# Patient Record
Sex: MALE | Race: WHITE | Employment: STUDENT | URBAN - METROPOLITAN AREA
[De-identification: names, ages, dates, MRNs, and addresses within clinical notes are randomized per-mention and may not be internally consistent; named-entity substitution may affect disease eponyms.]

---
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
## 2022-01-06 ENCOUNTER — NURSE ONLY (OUTPATIENT)
Dept: FAMILY MEDICINE CLINIC | Age: 22
End: 2022-01-06

## 2022-01-06 DIAGNOSIS — Z23 IMMUNIZATION DUE: Primary | ICD-10-CM

## 2022-01-06 PROCEDURE — 90471 IMMUNIZATION ADMIN: CPT | Performed by: FAMILY MEDICINE

## 2022-01-06 PROCEDURE — 90632 HEPA VACCINE ADULT IM: CPT | Performed by: FAMILY MEDICINE

## 2022-01-06 NOTE — PROGRESS NOTES
Immunizations Administered     Name Date Dose Route    Hepatitis A Adult (Havrix, Vaqta) 1/6/2022 1 mL Intramuscular    Site: Deltoid- Right    Lot: [facility]: [id number]

## 2022-08-05 ENCOUNTER — OFFICE VISIT (OUTPATIENT)
Dept: FAMILY MEDICINE CLINIC | Age: 22
End: 2022-08-05

## 2022-08-05 VITALS
HEIGHT: 72 IN | WEIGHT: 138 LBS | SYSTOLIC BLOOD PRESSURE: 112 MMHG | HEART RATE: 56 BPM | DIASTOLIC BLOOD PRESSURE: 72 MMHG | RESPIRATION RATE: 12 BRPM | BODY MASS INDEX: 18.69 KG/M2

## 2022-08-05 DIAGNOSIS — J02.9 ACUTE PHARYNGITIS, UNSPECIFIED ETIOLOGY: Primary | ICD-10-CM

## 2022-08-05 PROCEDURE — 99213 OFFICE O/P EST LOW 20 MIN: CPT | Performed by: FAMILY MEDICINE

## 2022-08-05 RX ORDER — AZITHROMYCIN 250 MG/1
250 TABLET, FILM COATED ORAL SEE ADMIN INSTRUCTIONS
Qty: 6 TABLET | Refills: 0 | Status: SHIPPED | OUTPATIENT
Start: 2022-08-05 | End: 2022-08-10

## 2022-08-05 RX ORDER — GUAIFENESIN, PSEUDOEPHEDRINE HYDROCHLORIDE 600; 60 MG/1; MG/1
1 TABLET, EXTENDED RELEASE ORAL EVERY 12 HOURS
Qty: 20 TABLET | Refills: 0 | Status: SHIPPED | OUTPATIENT
Start: 2022-08-05 | End: 2022-08-12

## 2022-08-05 SDOH — ECONOMIC STABILITY: FOOD INSECURITY: WITHIN THE PAST 12 MONTHS, YOU WORRIED THAT YOUR FOOD WOULD RUN OUT BEFORE YOU GOT MONEY TO BUY MORE.: NEVER TRUE

## 2022-08-05 SDOH — ECONOMIC STABILITY: FOOD INSECURITY: WITHIN THE PAST 12 MONTHS, THE FOOD YOU BOUGHT JUST DIDN'T LAST AND YOU DIDN'T HAVE MONEY TO GET MORE.: NEVER TRUE

## 2022-08-05 ASSESSMENT — ENCOUNTER SYMPTOMS
VOICE CHANGE: 1
NAUSEA: 0
ABDOMINAL PAIN: 0
TROUBLE SWALLOWING: 0
CONSTIPATION: 0
BLOOD IN STOOL: 0
BACK PAIN: 0
COUGH: 0
EYE PAIN: 0
CHEST TIGHTNESS: 0
SORE THROAT: 1
SHORTNESS OF BREATH: 0

## 2022-08-05 ASSESSMENT — PATIENT HEALTH QUESTIONNAIRE - PHQ9
SUM OF ALL RESPONSES TO PHQ QUESTIONS 1-9: 0
2. FEELING DOWN, DEPRESSED OR HOPELESS: 0
SUM OF ALL RESPONSES TO PHQ9 QUESTIONS 1 & 2: 0
SUM OF ALL RESPONSES TO PHQ QUESTIONS 1-9: 0
1. LITTLE INTEREST OR PLEASURE IN DOING THINGS: 0

## 2022-08-05 ASSESSMENT — SOCIAL DETERMINANTS OF HEALTH (SDOH): HOW HARD IS IT FOR YOU TO PAY FOR THE VERY BASICS LIKE FOOD, HOUSING, MEDICAL CARE, AND HEATING?: NOT HARD AT ALL

## 2022-08-05 NOTE — PROGRESS NOTES
Subjective:      Patient ID: Kailey Mccloud is a 25 y.o. male. No  covid  test     Pharyngitis  This is a new problem. Episode onset: last  3  to  4  days. Associated symptoms include a sore throat. Pertinent negatives include no abdominal pain, arthralgias, chest pain, congestion, coughing, fatigue, fever, headaches, joint swelling, nausea, rash or weakness. Nothing aggravates the symptoms. He has tried nothing for the symptoms. The treatment provided mild relief. No past medical history on file. Review of Systems   Constitutional:  Negative for fatigue and fever. HENT:  Positive for sore throat and voice change. Negative for congestion, ear pain, postnasal drip and trouble swallowing. Eyes:  Negative for pain. Respiratory:  Negative for cough, chest tightness and shortness of breath. Cardiovascular:  Negative for chest pain, palpitations and leg swelling. Gastrointestinal:  Negative for abdominal pain, blood in stool, constipation and nausea. Genitourinary:  Negative for difficulty urinating, frequency and urgency. Musculoskeletal:  Negative for arthralgias, back pain, joint swelling and neck stiffness. Skin:  Negative for rash. Neurological:  Negative for dizziness, weakness and headaches. Hematological:  Negative for adenopathy. Does not bruise/bleed easily. Psychiatric/Behavioral:  Negative for behavioral problems, dysphoric mood and sleep disturbance. ./72 (Site: Right Upper Arm, Position: Sitting, Cuff Size: Medium Adult)   Pulse 56   Resp 12   Ht 6' (1.829 m)   Wt 138 lb (62.6 kg)   BMI 18.72 kg/m²    Objective:   Physical Exam  Vitals and nursing note reviewed. Constitutional:       Appearance: He is well-developed. HENT:      Head: Normocephalic and atraumatic. Right Ear: External ear normal.      Left Ear: External ear normal.      Nose: Congestion present. Mouth/Throat:      Pharynx: Posterior oropharyngeal erythema present.       Comments: No  exudate  but  tonsils  2 + and  red   Eyes:      Conjunctiva/sclera: Conjunctivae normal.      Pupils: Pupils are equal, round, and reactive to light. Comments: Fundi nl   Neck:      Thyroid: No thyromegaly. Comments:   No  anterior  or  posterior  nodes   Cardiovascular:      Rate and Rhythm: Normal rate and regular rhythm. Heart sounds: Normal heart sounds. Pulmonary:      Effort: Pulmonary effort is normal.      Breath sounds: Normal breath sounds. No wheezing or rales. Abdominal:      General: Bowel sounds are normal.      Palpations: Abdomen is soft. There is no mass. Tenderness: There is no abdominal tenderness. Musculoskeletal:         General: Normal range of motion. Cervical back: Normal range of motion and neck supple. Lymphadenopathy:      Cervical: No cervical adenopathy. Skin:     General: Skin is warm and dry. Findings: No rash. Neurological:      Mental Status: He is alert and oriented to person, place, and time. Cranial Nerves: No cranial nerve deficit. Deep Tendon Reflexes: Reflexes are normal and symmetric. Assessment:        ICD-10-CM    1. Acute pharyngitis, unspecified etiology  J02.9              Plan:      Current Outpatient Medications   Medication Sig Dispense Refill    azithromycin (ZITHROMAX) 250 MG tablet Take 1 tablet by mouth See Admin Instructions for 5 days 500mg on day 1 followed by 250mg on days 2 - 5 6 tablet 0    pseudoephedrine-guaiFENesin (MUCINEX D)  MG per extended release tablet Take 1 tablet by mouth in the morning and 1 tablet in the evening. Do all this for 7 days. 20 tablet 0     No current facility-administered medications for this visit. No orders of the defined types were placed in this encounter.          See in    prn   Cecelia Olivares MD

## 2022-08-14 ENCOUNTER — HOSPITAL ENCOUNTER (EMERGENCY)
Age: 22
Discharge: HOME | End: 2022-08-14
Payer: COMMERCIAL

## 2022-08-14 VITALS
DIASTOLIC BLOOD PRESSURE: 65 MMHG | HEIGHT: 72 IN | SYSTOLIC BLOOD PRESSURE: 121 MMHG | OXYGEN SATURATION: 97 % | HEART RATE: 72 BPM | TEMPERATURE: 98.6 F | BODY MASS INDEX: 18.28 KG/M2 | RESPIRATION RATE: 16 BRPM | WEIGHT: 135 LBS

## 2022-08-14 DIAGNOSIS — S00.91XA ABRASION OF HEAD, INITIAL ENCOUNTER: Primary | ICD-10-CM

## 2022-08-14 PROCEDURE — 99203 OFFICE O/P NEW LOW 30 MIN: CPT | Performed by: NURSE PRACTITIONER

## 2022-08-14 PROCEDURE — 99203 OFFICE O/P NEW LOW 30 MIN: CPT

## 2022-08-14 ASSESSMENT — ENCOUNTER SYMPTOMS
SORE THROAT: 0
NAUSEA: 0
COUGH: 0
SHORTNESS OF BREATH: 0
VOMITING: 0

## 2022-08-14 ASSESSMENT — PAIN - FUNCTIONAL ASSESSMENT: PAIN_FUNCTIONAL_ASSESSMENT: 0-10

## 2022-08-14 ASSESSMENT — PAIN DESCRIPTION - ORIENTATION: ORIENTATION: LEFT

## 2022-08-14 ASSESSMENT — PAIN SCALES - GENERAL: PAINLEVEL_OUTOF10: 2

## 2022-08-14 ASSESSMENT — PAIN DESCRIPTION - LOCATION: LOCATION: HEAD

## 2022-08-14 NOTE — ED PROVIDER NOTES
[facility]  Urgent Care Encounter       CHIEF COMPLAINT       Chief Complaint   Patient presents with    Abrasion       Nurses Notes reviewed and I agree except as noted in the HPI. HISTORY OF PRESENT ILLNESS   Kenia Reddy is a 25 y.o. male who presents for evaluation of a wound to the left side of his head. Patient states that he was playing basketball last night around midnight when a ball hit a loose piece of wood which fell and hit him in the head. Patient states that he did not lose consciousness and has not had any visual disturbances, numbness or tingling, headaches or nausea or vomiting. Mother states that she did apply some antibiotic ointment and clean to the wound last night. Mother states that the patient is up-to-date on his tetanus. The history is provided by the patient. REVIEW OF SYSTEMS     Review of Systems   Constitutional:  Negative for chills and fever. HENT:  Negative for congestion and sore throat. Respiratory:  Negative for cough and shortness of breath. Cardiovascular:  Negative for chest pain. Gastrointestinal:  Negative for nausea and vomiting. Musculoskeletal:  Negative for arthralgias and myalgias. Skin:  Positive for wound. Negative for rash. Allergic/Immunologic: Negative for immunocompromised state. Neurological:  Negative for dizziness, weakness and headaches. PAST MEDICAL HISTORY   History reviewed. No pertinent past medical history. SURGICALHISTORY     Patient  has no past surgical history on file. CURRENT MEDICATIONS       Previous Medications    No medications on file       ALLERGIES     Patient is has No Known Allergies.     Patients   Immunization History   Administered Date(s) Administered    COVID-19, PFIZER PURPLE top, DILUTE for use, (age 15 y+), 30mcg/0.3mL 08/02/2021, 08/23/2021    DTaP 01/30/2003, 03/25/2003, 06/02/2003, 07/26/2004, 08/07/2008    HPV 9-valent Odella Sol) 07/06/2021, 08/12/2021    HPV Quadrivalent (Gardasil) 01/06/2022    Hepatitis A Adult (Havrix, Vaqta) 07/06/2021, 01/06/2022    Hepatitis B 2002, 01/30/2003, 06/02/2003    Hib, unspecified 01/30/2003, 03/25/2003, 06/02/2003, 03/08/2004    Influenza, MDCK Quadv, with preserv IM (Flucelvax 2 yrs and older) 11/26/2019    Influenza, Quadv, IM, (6 mo and older Fluzone, Flulaval, Fluarix and 3 yrs and older Afluria) 10/26/2018, 01/13/2021    MMR 03/08/2004, 08/21/2007    Meningococcal MCV4P (Menactra) 07/14/2020    PPD Test 07/06/2021    Polio IPV (IPOL) 01/30/2003, 03/25/2003, 07/26/2004, 08/07/2008    Tdap (Boostrix, Adacel) 08/04/2015, 07/14/2020    Varicella (Varivax) 03/02/2004, 08/04/2015       FAMILY HISTORY     Patient's family history is not on file. SOCIAL HISTORY     Patient  reports that he has never smoked. He has never used smokeless tobacco. He reports that he does not drink alcohol and does not use drugs. PHYSICAL EXAM     ED TRIAGE VITALS  BP: 121/65, Temp: 98.6 °F (37 °C), Heart Rate: 72, Resp: 16, SpO2: 97 %,Estimated body mass index is 18.31 kg/m² as calculated from the following:    Height as of this encounter: 6' (1.829 m). Weight as of this encounter: 135 lb (61.2 kg). ,No LMP for male patient. Physical Exam  Vitals and nursing note reviewed. Constitutional:       General: He is not in acute distress. Appearance: He is well-developed. He is not diaphoretic. HENT:      Head: Abrasion present. No raccoon eyes or Hodgson's sign. Comments: 2.5 cm linear abrasion noted to the left scalp  Eyes:      Extraocular Movements: Extraocular movements intact. Conjunctiva/sclera:      Right eye: Right conjunctiva is not injected. Left eye: Left conjunctiva is not injected. Pupils: Pupils are equal, round, and reactive to light. Pupils are equal.   Cardiovascular:      Rate and Rhythm: Normal rate and regular rhythm. Heart sounds: No murmur heard.   Pulmonary:      Effort: Pulmonary effort is normal. No respiratory distress. Breath sounds: Normal breath sounds. Musculoskeletal:      Cervical back: Full passive range of motion without pain and normal range of motion. No spinous process tenderness or muscular tenderness. Skin:     General: Skin is warm. Findings: No rash. Neurological:      Mental Status: He is alert and oriented to person, place, and time. GCS: GCS eye subscore is 4. GCS verbal subscore is 5. GCS motor subscore is 6. Motor: Motor function is intact. Psychiatric:         Behavior: Behavior normal.       DIAGNOSTIC RESULTS     Labs:No results found for this visit on 08/14/22. IMAGING:    No orders to display         EKG:      URGENT CARE COURSE:     Vitals:    08/14/22 1104   BP: 121/65   Pulse: 72   Resp: 16   Temp: 98.6 °F (37 °C)   SpO2: 97%   Weight: 135 lb (61.2 kg)   Height: 6' (1.829 m)       Medications - No data to display         PROCEDURES:  None    FINAL IMPRESSION      1. Abrasion of head, initial encounter          DISPOSITION/ PLAN     Wound to the scalp is superficial and I discussed that this will likely heal on its own. Patient will be given a prescription for Bactroban ointment to ensure that there is no infection is advised to keep the area clean and dry as much as possible. Advised to follow-up on an outpatient basis as needed and is agreeable to plan as discussed. PATIENT REFERRED TO:  Traci Porras MD  [address] [place] [address] / [facility] [id number]      DISCHARGE MEDICATIONS:  New Prescriptions    MUPIROCIN (BACTROBAN) 2 % OINTMENT    Apply topically 3 times daily.        Discontinued Medications    No medications on file       Current Discharge Medication List          MARY Dover CNP    (Please note that portions of this note were completed with a voice recognition program. Efforts were made to edit the dictations but occasionally words are mis-transcribed.)          MARY Dover CNP  08/14/22 [id number]

## 2022-08-14 NOTE — ED NOTES
Pt complains he was accidentally hit in the head with a wooden board while playing basketball last night. States the ball hit the board and fell on his head. Denies LOC. Linear abrasion noted on left side of head. No bleeding noted at this time.       Brandon Alvarado RN  08/14/22 2141

## 2023-06-06 ENCOUNTER — OFFICE VISIT (OUTPATIENT)
Dept: FAMILY MEDICINE CLINIC | Age: 23
End: 2023-06-06

## 2023-06-06 VITALS
RESPIRATION RATE: 12 BRPM | WEIGHT: 129.25 LBS | BODY MASS INDEX: 17.51 KG/M2 | HEIGHT: 72 IN | HEART RATE: 76 BPM | SYSTOLIC BLOOD PRESSURE: 114 MMHG | DIASTOLIC BLOOD PRESSURE: 68 MMHG

## 2023-06-06 DIAGNOSIS — R10.33 PERIUMBILICAL ABDOMINAL PAIN: Primary | ICD-10-CM

## 2023-06-06 DIAGNOSIS — K59.09 OTHER CONSTIPATION: ICD-10-CM

## 2023-06-06 SDOH — ECONOMIC STABILITY: INCOME INSECURITY: HOW HARD IS IT FOR YOU TO PAY FOR THE VERY BASICS LIKE FOOD, HOUSING, MEDICAL CARE, AND HEATING?: NOT HARD AT ALL

## 2023-06-06 SDOH — ECONOMIC STABILITY: HOUSING INSECURITY
IN THE LAST 12 MONTHS, WAS THERE A TIME WHEN YOU DID NOT HAVE A STEADY PLACE TO SLEEP OR SLEPT IN A SHELTER (INCLUDING NOW)?: NO

## 2023-06-06 SDOH — ECONOMIC STABILITY: FOOD INSECURITY: WITHIN THE PAST 12 MONTHS, YOU WORRIED THAT YOUR FOOD WOULD RUN OUT BEFORE YOU GOT MONEY TO BUY MORE.: NEVER TRUE

## 2023-06-06 SDOH — ECONOMIC STABILITY: FOOD INSECURITY: WITHIN THE PAST 12 MONTHS, THE FOOD YOU BOUGHT JUST DIDN'T LAST AND YOU DIDN'T HAVE MONEY TO GET MORE.: NEVER TRUE

## 2023-06-06 ASSESSMENT — ENCOUNTER SYMPTOMS
SHORTNESS OF BREATH: 0
NAUSEA: 0
TROUBLE SWALLOWING: 0
COUGH: 0
EYE PAIN: 0
CONSTIPATION: 0
CHEST TIGHTNESS: 0
SORE THROAT: 0
BLOOD IN STOOL: 0
ABDOMINAL PAIN: 1
BACK PAIN: 0

## 2023-06-06 NOTE — PROGRESS NOTES
Conjunctiva/sclera: Conjunctivae normal.      Pupils: Pupils are equal, round, and reactive to light. Comments: Fundi nl   Neck:      Thyroid: No thyromegaly. Cardiovascular:      Rate and Rhythm: Normal rate and regular rhythm. Heart sounds: Normal heart sounds. Pulmonary:      Effort: Pulmonary effort is normal.      Breath sounds: Normal breath sounds. No wheezing or rales. Abdominal:      General: Bowel sounds are normal.      Palpations: Abdomen is soft. There is no mass. Tenderness: There is no abdominal tenderness. Comments:   Mild  pain llq  gutter and  with pain   Musculoskeletal:         General: Normal range of motion. Cervical back: Normal range of motion and neck supple. Lymphadenopathy:      Cervical: No cervical adenopathy. Skin:     General: Skin is warm and dry. Findings: No rash. Neurological:      Mental Status: He is alert and oriented to person, place, and time. Cranial Nerves: No cranial nerve deficit. Deep Tendon Reflexes: Reflexes are normal and symmetric. Assessment:        ICD-10-CM    1. Periumbilical abdominal pain  R10.33       2. Other constipation  K59.09          No current outpatient medications on file. No current facility-administered medications for this visit. Plan:      No current outpatient medications on file. No current facility-administered medications for this visit.           Fibercon  2 a day and  use  of  moM  for   2  night   labs  if  not  better    Faviola Comer MD